# Patient Record
(demographics unavailable — no encounter records)

---

## 2024-12-17 NOTE — HISTORY OF PRESENT ILLNESS
[de-identified] : 12/17/24: Pt is here for closed nondisplaced fracture of base of first metacarpal bone of left hand. Pt states that thumb is doing well in cast today.  Reviewed notes from DUNCAN Castro:  12/15/24: Pt is a rhd 13 y/o male presenting of left thumb pain.  PT states his thumb got caught between 2 players playing basketball yesterday 12/14/24.  Ice to affected area.   PMH: denied Allergies: nkda

## 2024-12-17 NOTE — HISTORY OF PRESENT ILLNESS
[de-identified] : 12/17/24: Pt is here for closed nondisplaced fracture of base of first metacarpal bone of left hand. Pt states that thumb is doing well in cast today.  Reviewed notes from DUNCAN Castro:  12/15/24: Pt is a rhd 13 y/o male presenting of left thumb pain.  PT states his thumb got caught between 2 players playing basketball yesterday 12/14/24.  Ice to affected area.   PMH: denied Allergies: nkda

## 2024-12-17 NOTE — ASSESSMENT
[FreeTextEntry1] : The patient was advised of the diagnosis. The natural history of the pathology was explained in full to the patient in layman's terms. All questions were answered. The risks and benefits of surgical and non-surgical treatment alternatives were explained in full to the patient.  Discussed likely healing during of approx. 3 weeks  Hand based thumb spica splint place. A splint was applied.  The importance of ice and elevation were discussed with the patient.  The risks were also discussed such as compartment syndrome and skin breakdown.  They were instructed to never put foreign objects down the splint.  Patients should call for increasing pain, worsening swelling, numbness, extremity discoloration, or any other concerns.  No gym/sports  RTO in 3 weeks

## 2024-12-17 NOTE — IMAGING
[de-identified] : left thumb swelling / no ecchymosis or erythema TTP over base of 1st mc all digits nvi with farom normal cascade left wrist with no ttp / full and pain free ROM.  left hand 3 view xray performed 12/15/2024 showed no obvious bony pathology

## 2024-12-17 NOTE — IMAGING
[de-identified] : left thumb swelling / no ecchymosis or erythema TTP over base of 1st mc all digits nvi with farom normal cascade left wrist with no ttp / full and pain free ROM.  left hand 3 view xray performed 12/15/2024 showed no obvious bony pathology

## 2025-01-07 NOTE — HISTORY OF PRESENT ILLNESS
[4] : 4 [2] : 2 [Dull/Aching] : dull/aching [Sharp] : sharp [Constant] : constant [Leisure] : leisure [Student] : Work status: student [de-identified] : Patient states his left ankle hurt after a hike 4-5 days ago. He continued to hike the following day and it swelled up. He states it was a very intese hike.  Now having pain when he walks, although it has gotten better. Previous ankle sprain last year treated with boot.  [] : no [de-identified] : activity [de-identified] : last year

## 2025-01-07 NOTE — PHYSICAL EXAM
[Left] : left foot and ankle [Mild] : mild diffused ankle swelling [5th] : 5th [] : no plantar metatarsal head tenderness [FreeTextEntry8] : base  [FreeTextEntry9] : good rom

## 2025-01-07 NOTE — DISCUSSION/SUMMARY
[de-identified] : General Dx Discussion The patient was advised of the diagnosis. The natural history of the pathology was explained in full to the patient in layman's terms. All questions were answered. The risks and benefits of surgical and non-surgical treatment alternatives were explained in full to the patient.  Case Discussed. Cannot r/o occult injury / fx  Recommend Cam Boot. Ice and nsaids as needed. No gym or sports.  f/u Dr. Lambert in 2 weeks.    Entered by Ruby LINK acting as a scribe. Instructions: Dr. Bolton- The documentation recorded by the scribe accurately reflects the service I personally performed and the decisions made by me.

## 2025-01-07 NOTE — DISCUSSION/SUMMARY
[de-identified] : General Dx Discussion The patient was advised of the diagnosis. The natural history of the pathology was explained in full to the patient in layman's terms. All questions were answered. The risks and benefits of surgical and non-surgical treatment alternatives were explained in full to the patient.  Case Discussed. Cannot r/o occult injury / fx  Recommend Cam Boot. Ice and nsaids as needed. No gym or sports.  f/u Dr. Lambert in 2 weeks.    Entered by Ruby LINK acting as a scribe. Instructions: Dr. Bolton- The documentation recorded by the scribe accurately reflects the service I personally performed and the decisions made by me.

## 2025-01-07 NOTE — HISTORY OF PRESENT ILLNESS
[4] : 4 [2] : 2 [Dull/Aching] : dull/aching [Sharp] : sharp [Constant] : constant [Leisure] : leisure [Student] : Work status: student [de-identified] : Patient states his left ankle hurt after a hike 4-5 days ago. He continued to hike the following day and it swelled up. He states it was a very intese hike.  Now having pain when he walks, although it has gotten better. Previous ankle sprain last year treated with boot.  [] : no [de-identified] : activity [de-identified] : last year

## 2025-01-27 NOTE — PHYSICAL EXAM
[Left] : left foot and ankle [NL (20)] : dorsiflexion 20 degrees [NL (40)] : plantar flexion 40 degrees [2+] : posterior tibialis pulse: 2+ [] : no ecchymosis [FreeTextEntry8] : non tender [de-identified] : able to hop on ankle without pain

## 2025-01-27 NOTE — RETURN TO WORK/SCHOOL
[FreeTextEntry1] :   To whom it may concern,   Diagnosis: ankle sprain   _ Patient may return to work: _ Patient may not return to work until: _ Patient may return to school: _ Patient cannot participate in gym/sports until: x_ Patient may return to gym/sports on 1/22/25 _ Patient is on limited weight bearing: _ Patient may return to full activities: _ Patient requires early release from class/elevator pass for (or until):   SPECIAL INSTRUCTIONS:   Please excuse the patient, Owen Mohsenian,  as he was seen in our office today, 1/22/25, under the care of Dr. Lambert   _ Full duty, no restrictions. _ Light duty, as follows: _ Limited duty, as follows: _ With Cast   _  With Brace   _ With Crutches   _ With Boot     Sincerely,   Evan Lambert MD Co- Chief Sports Medicine [facility] Medical Director [facility]  \[facility]\"" [employer]

## 2025-01-27 NOTE — RETURN TO WORK/SCHOOL
[FreeTextEntry1] :   To whom it may concern,   Diagnosis: ankle sprain   _ Patient may return to work: _ Patient may not return to work until: _ Patient may return to school: _ Patient cannot participate in gym/sports until: x_ Patient may return to gym/sports on 1/22/25 _ Patient is on limited weight bearing: _ Patient may return to full activities: _ Patient requires early release from class/elevator pass for (or until):   SPECIAL INSTRUCTIONS:   Please excuse the patient, Owen Mohsenian,  as he was seen in our office today, 1/22/25, under the care of Dr. Lambert   _ Full duty, no restrictions. _ Light duty, as follows: _ Limited duty, as follows: _ With Cast   _  With Brace   _ With Crutches   _ With Boot     Sincerely,   Evan Lambert MD Co- Chief Sports Medicine [facility] Medical Director [facility]  [facility] [employer] [facility] [employer]

## 2025-01-27 NOTE — HISTORY OF PRESENT ILLNESS
[de-identified] : Here for consult on the left ankle today. Had seen Dr Bolton initially and given a cam boot. Patient removed over the weekend and has been having no pain complaints since.

## 2025-01-27 NOTE — DISCUSSION/SUMMARY
[Medication Risks Reviewed] : Medication risks reviewed [Surgical risks reviewed] : Surgical risks reviewed [de-identified] : The patient's condition is acute Confounding medical conditions/concerns: N/A Tests/Studies Independently Interpreted Today: Reviewed prev imaging  ------------------------------------------------------------------------------------------------------------------   I spoke with Dr. Bolton, reviewed notes, and images.    The patient continues to improve on a gradual, interval basis reporting no recurrent symptoms or instability. Grossly benign physical examination upon office visit. Pt is able to hop on ankle without pain Discussed with pt and mother that at this time, the patient has no activity restrictions and may continue to advance activity as pain permits. If pain returns with return to play could consider PT and anti-inflammatories  Prescribed the patient Motrin 600mgs and discussed risks of side effects and timing and management of medication. Side effects include but are not limited to gi ulcers and irritation, as well as kidney failure and bleeding issues. f/u in 4 weeks if pain persist    I, Lanny Castillo, attest that this documentation has been prepared under the direction and in the presence of Provider Dr. Evan Lambert

## 2025-01-27 NOTE — PHYSICAL EXAM
[Left] : left foot and ankle [NL (20)] : dorsiflexion 20 degrees [NL (40)] : plantar flexion 40 degrees [2+] : posterior tibialis pulse: 2+ [] : no ecchymosis [FreeTextEntry8] : non tender [de-identified] : able to hop on ankle without pain

## 2025-01-27 NOTE — DISCUSSION/SUMMARY
[Medication Risks Reviewed] : Medication risks reviewed [Surgical risks reviewed] : Surgical risks reviewed [de-identified] : The patient's condition is acute Confounding medical conditions/concerns: N/A Tests/Studies Independently Interpreted Today: Reviewed prev imaging  ------------------------------------------------------------------------------------------------------------------   I spoke with Dr. Bolton, reviewed notes, and images.    The patient continues to improve on a gradual, interval basis reporting no recurrent symptoms or instability. Grossly benign physical examination upon office visit. Pt is able to hop on ankle without pain Discussed with pt and mother that at this time, the patient has no activity restrictions and may continue to advance activity as pain permits. If pain returns with return to play could consider PT and anti-inflammatories  Prescribed the patient Motrin 600mgs and discussed risks of side effects and timing and management of medication. Side effects include but are not limited to gi ulcers and irritation, as well as kidney failure and bleeding issues. f/u in 4 weeks if pain persist    I, Lanny Castillo, attest that this documentation has been prepared under the direction and in the presence of Provider Dr. Evan Lambert

## 2025-01-27 NOTE — HISTORY OF PRESENT ILLNESS
[de-identified] : Here for consult on the left ankle today. Had seen Dr Bolton initially and given a cam boot. Patient removed over the weekend and has been having no pain complaints since.

## 2025-03-19 NOTE — DISCUSSION/SUMMARY
[de-identified] : It is still unclear why he has limited motion. MRI left ankle ordered to rule out a tarsal coaltion. Depending on the mri findings certain treatment  (ie orthotics or possible future surgery) will be discussed. He currently can do activities as tolerated. Icing/nsaids prn.

## 2025-03-19 NOTE — PHYSICAL EXAM
[NL (40)] : plantar flexion 40 degrees [NL (20)] : eversion 20 degrees [4___] : inversion 4[unfilled]/5 [5___] : eversion 5[unfilled]/5 [2+] : posterior tibialis pulse: 2+ [Normal] : saphenous nerve sensation normal [Weight -] : weightbearing [] : non-antalgic [Left] : left foot [Weight -] : weightbearing [There are no fractures, subluxations or dislocations. No significant abnormalities are seen] : There are no fractures, subluxations or dislocations. No significant abnormalities are seen [Open growth plates] : Open growth plates [FreeTextEntry3] : Normal alignment of right foot. [de-identified] : Hindfoot swings into varus on right but not on left with single heel rise. [FreeTextEntry9] :  AP, mortise and lateral xrays of the ankle were taken and reviewed today. [de-identified] :  AP and oblique views of the foot were taken/reviewed today.  [de-identified] : inversion 0 degrees

## 2025-03-19 NOTE — HISTORY OF PRESENT ILLNESS
[de-identified] : Patient is a 12 year old male here for his left ankle.  He reports having a sprain in September, 2023 which resolved.  He had another sprain while hiking in December, 2024 which also recovered. He reports having pain/swelling in the left ankle after playing sports on 3/15/25.  He reports improvement since then with rest, but still reports some pain. [FreeTextEntry1] : Left ankle

## 2025-04-02 NOTE — DISCUSSION/SUMMARY
[de-identified] : MRI report and films reviewed with patient.  WBAT in supportive footwear, accommodative orthotics rx provided. Recommend a course of PT.  Physical therapy was prescribed for 2-3 times per week for four weeks. Ice to affected area.  He can do activities as tolerated.

## 2025-04-02 NOTE — HISTORY OF PRESENT ILLNESS
[de-identified] : Patient is a 12 year old male here for his left ankle. He reports having a sprain in September, 2023 which resolved.  He had another sprain while hiking in December, 2024 which also recovered. He reports having pain/swelling in the left ankle after playing sports on 3/15/25. WB in sneakers today. MRI negative for a tarsal coalition.  [FreeTextEntry1] : Left ankle

## 2025-04-02 NOTE — PHYSICAL EXAM
[NL (40)] : plantar flexion 40 degrees [NL (20)] : eversion 20 degrees [4___] : inversion 4[unfilled]/5 [5___] : eversion 5[unfilled]/5 [2+] : posterior tibialis pulse: 2+ [Normal] : saphenous nerve sensation normal [Weight -] : weightbearing [Left] : left foot [Weight -] : weightbearing [There are no fractures, subluxations or dislocations. No significant abnormalities are seen] : There are no fractures, subluxations or dislocations. No significant abnormalities are seen [Open growth plates] : Open growth plates [] : non-antalgic [FreeTextEntry3] : Normal alignment of right foot. [de-identified] : Hindfoot swings into varus on right but not on left with single heel rise. [FreeTextEntry9] :  AP, mortise and lateral xrays of the ankle were taken and reviewed today. [de-identified] :  AP and oblique views of the foot were taken/reviewed today.  [de-identified] : inversion 10 degrees

## 2025-05-14 NOTE — DISCUSSION/SUMMARY
[de-identified] : Patient is doing better.  Once again custom accommodative orthotics are recommended and a new rx was provided. He will finish out his course of PT and continue with home exercises. Follow up as needed.

## 2025-05-14 NOTE — HISTORY OF PRESENT ILLNESS
[de-identified] : Patient is a 12 year old male here for his left ankle. He reports having a sprain in September, 2023 which resolved.  He had another sprain while hiking in December, 2024 which also recovered. Recent MRI negative for a tarsal coalition.  He has been going to PT and is back to all activities.  Pain is rare/mild at this time.  He never obtained his custom orthotics. [FreeTextEntry1] : Left ankle

## 2025-05-14 NOTE — PHYSICAL EXAM
[NL (40)] : plantar flexion 40 degrees [NL (20)] : eversion 20 degrees [2+] : posterior tibialis pulse: 2+ [Normal] : saphenous nerve sensation normal [Weight -] : weightbearing [Left] : left foot [Weight -] : weightbearing [There are no fractures, subluxations or dislocations. No significant abnormalities are seen] : There are no fractures, subluxations or dislocations. No significant abnormalities are seen [Open growth plates] : Open growth plates [5___] : inversion 5[unfilled]/5 [] : non-antalgic [FreeTextEntry3] : Normal alignment of right foot. [FreeTextEntry9] :  AP, mortise and lateral xrays of the ankle were taken and reviewed today. [de-identified] :  AP and oblique views of the foot were taken/reviewed today.  [de-identified] : inversion 10 degrees